# Patient Record
Sex: FEMALE | Race: WHITE | NOT HISPANIC OR LATINO | Employment: FULL TIME | URBAN - METROPOLITAN AREA
[De-identification: names, ages, dates, MRNs, and addresses within clinical notes are randomized per-mention and may not be internally consistent; named-entity substitution may affect disease eponyms.]

---

## 2023-01-26 PROBLEM — M25.50 JOINT PAIN: Status: ACTIVE | Noted: 2023-01-26

## 2023-01-26 PROBLEM — J30.2 SEASONAL ALLERGIES: Status: ACTIVE | Noted: 2023-01-26

## 2023-01-26 PROBLEM — M79.646 PAIN IN HAND AND FINGERS: Status: ACTIVE | Noted: 2023-01-26

## 2023-01-26 PROBLEM — S13.9XXA NECK SPRAIN: Status: ACTIVE | Noted: 2023-01-26

## 2023-01-26 PROBLEM — M79.643 PAIN IN HAND AND FINGERS: Status: ACTIVE | Noted: 2023-01-26

## 2023-01-26 PROBLEM — R63.6 LOW WEIGHT: Status: ACTIVE | Noted: 2023-01-26

## 2023-01-26 PROBLEM — G43.009 MIGRAINE WITHOUT AURA, NOT REFRACTORY: Status: ACTIVE | Noted: 2023-01-26

## 2023-01-26 RX ORDER — CETIRIZINE HYDROCHLORIDE 10 MG/1
1 TABLET ORAL DAILY
COMMUNITY

## 2023-04-21 ENCOUNTER — OFFICE VISIT (OUTPATIENT)
Dept: PRIMARY CARE | Facility: CLINIC | Age: 28
End: 2023-04-21
Payer: COMMERCIAL

## 2023-04-21 VITALS
SYSTOLIC BLOOD PRESSURE: 119 MMHG | HEIGHT: 67 IN | WEIGHT: 109 LBS | HEART RATE: 84 BPM | OXYGEN SATURATION: 98 % | DIASTOLIC BLOOD PRESSURE: 74 MMHG | BODY MASS INDEX: 17.11 KG/M2

## 2023-04-21 DIAGNOSIS — G43.009 MIGRAINE WITHOUT AURA, NOT REFRACTORY: ICD-10-CM

## 2023-04-21 DIAGNOSIS — Z00.00 WELL ADULT EXAM: Primary | ICD-10-CM

## 2023-04-21 PROBLEM — M79.643 PAIN IN HAND AND FINGERS: Status: RESOLVED | Noted: 2023-01-26 | Resolved: 2023-04-21

## 2023-04-21 PROBLEM — M25.50 JOINT PAIN: Status: RESOLVED | Noted: 2023-01-26 | Resolved: 2023-04-21

## 2023-04-21 PROBLEM — S13.9XXA NECK SPRAIN: Status: RESOLVED | Noted: 2023-01-26 | Resolved: 2023-04-21

## 2023-04-21 PROBLEM — M79.646 PAIN IN HAND AND FINGERS: Status: RESOLVED | Noted: 2023-01-26 | Resolved: 2023-04-21

## 2023-04-21 PROCEDURE — 90471 IMMUNIZATION ADMIN: CPT | Performed by: FAMILY MEDICINE

## 2023-04-21 PROCEDURE — 90715 TDAP VACCINE 7 YRS/> IM: CPT | Performed by: FAMILY MEDICINE

## 2023-04-21 PROCEDURE — 1036F TOBACCO NON-USER: CPT | Performed by: FAMILY MEDICINE

## 2023-04-21 PROCEDURE — 99395 PREV VISIT EST AGE 18-39: CPT | Performed by: FAMILY MEDICINE

## 2023-04-21 RX ORDER — FLUTICASONE PROPIONATE 50 MCG
SPRAY, SUSPENSION (ML) NASAL
COMMUNITY

## 2023-04-21 RX ORDER — SUMATRIPTAN SUCCINATE 100 MG/1
TABLET ORAL
COMMUNITY
Start: 2020-08-11 | End: 2023-04-21 | Stop reason: SDUPTHER

## 2023-04-21 RX ORDER — LEVONORGESTREL AND ETHINYL ESTRADIOL 0.15-0.03
1 KIT ORAL DAILY
COMMUNITY

## 2023-04-21 RX ORDER — SUMATRIPTAN SUCCINATE 100 MG/1
TABLET ORAL
Qty: 9 TABLET | Refills: 2 | Status: SHIPPED | OUTPATIENT
Start: 2023-04-21

## 2023-04-21 ASSESSMENT — PATIENT HEALTH QUESTIONNAIRE - PHQ9
SUM OF ALL RESPONSES TO PHQ9 QUESTIONS 1 AND 2: 0
1. LITTLE INTEREST OR PLEASURE IN DOING THINGS: NOT AT ALL
2. FEELING DOWN, DEPRESSED OR HOPELESS: NOT AT ALL

## 2023-04-21 NOTE — PROGRESS NOTES
"  Subjective   Patient ID: Paula Garzon is a 27 y.o. female who presents for Annual Exam (Annual physical, has an OB/Review echo from 12/22/Would like neurology referral).  Echo reviewed-normal    She was dx with migraines about 5 years ago.  Imitrex was tried after a few things and works well.  She uses this about 1x/months    Past Medical, Surgical, Social and Family Hx reviewed-Yes    Any acute complaints?    No    Any chronic issues addressed today or Rx refills needed?    Yes, imitrex    Last pap up to date  Labs Reviewed June 2022 labs, B12 and vit d low  Up to date on immunizations yes, may need TDaP  Dentist in the past year yes    Menstruation no concerns  Sexual Activity no concerns      PE:  /74   Pulse 84   Ht 1.689 m (5' 6.5\")   Wt 49.4 kg (109 lb)   LMP 04/12/2023   SpO2 98%   BMI 17.33 kg/m²   Alert adult woman, NAD  HEENT clear  Neck supple, No LAD  Heart RRR no murmur  Lungs CTA bilat  Abdomen benign, normal BS, soft NT/ND  Skin warm, dry, clear  Neuro grossly normal, gait WNL  Affect pleasant and appropriate, memory and judgement WNL        Assessment/Plan   Problem List Items Addressed This Visit          Other    Migraine without aura, not refractory    Relevant Medications    SUMAtriptan (Imitrex) 100 mg tablet     Other Visit Diagnoses       Well adult exam    -  Primary          Reviewed HM and vaccines     "